# Patient Record
Sex: FEMALE | Race: OTHER | HISPANIC OR LATINO
[De-identification: names, ages, dates, MRNs, and addresses within clinical notes are randomized per-mention and may not be internally consistent; named-entity substitution may affect disease eponyms.]

---

## 2024-09-14 ENCOUNTER — RESULT REVIEW (OUTPATIENT)
Age: 44
End: 2024-09-14

## 2024-09-16 ENCOUNTER — TRANSCRIPTION ENCOUNTER (OUTPATIENT)
Age: 44
End: 2024-09-16

## 2024-09-23 PROBLEM — Z00.00 ENCOUNTER FOR PREVENTIVE HEALTH EXAMINATION: Status: ACTIVE | Noted: 2024-09-23

## 2024-09-26 DIAGNOSIS — N83.202 UNSPECIFIED OVARIAN CYST, LEFT SIDE: ICD-10-CM

## 2024-09-26 DIAGNOSIS — K43.9 VENTRAL HERNIA W/OUT OBSTRUCTION OR GANGRENE: ICD-10-CM

## 2024-09-26 PROBLEM — K35.80 ACUTE APPENDICITIS, UNCOMPLICATED: Status: ACTIVE | Noted: 2024-09-26

## 2024-09-30 ENCOUNTER — APPOINTMENT (OUTPATIENT)
Dept: SURGERY | Facility: CLINIC | Age: 44
End: 2024-09-30

## 2024-09-30 ENCOUNTER — APPOINTMENT (OUTPATIENT)
Dept: SURGERY | Facility: CLINIC | Age: 44
End: 2024-09-30
Payer: SELF-PAY

## 2024-09-30 VITALS
DIASTOLIC BLOOD PRESSURE: 60 MMHG | WEIGHT: 130 LBS | OXYGEN SATURATION: 98 % | TEMPERATURE: 98.6 F | HEART RATE: 76 BPM | SYSTOLIC BLOOD PRESSURE: 98 MMHG

## 2024-09-30 VITALS — BODY MASS INDEX: 23.03 KG/M2 | HEIGHT: 63 IN

## 2024-09-30 DIAGNOSIS — Z82.49 FAMILY HISTORY OF ISCHEMIC HEART DISEASE AND OTHER DISEASES OF THE CIRCULATORY SYSTEM: ICD-10-CM

## 2024-09-30 DIAGNOSIS — Z83.511 FAMILY HISTORY OF GLAUCOMA: ICD-10-CM

## 2024-09-30 DIAGNOSIS — Z80.51 FAMILY HISTORY OF MALIGNANT NEOPLASM OF KIDNEY: ICD-10-CM

## 2024-09-30 DIAGNOSIS — Z78.9 OTHER SPECIFIED HEALTH STATUS: ICD-10-CM

## 2024-09-30 DIAGNOSIS — K35.80 UNSPECIFIED ACUTE APPENDICITIS: ICD-10-CM

## 2024-09-30 PROCEDURE — 99024 POSTOP FOLLOW-UP VISIT: CPT

## 2024-10-01 NOTE — HISTORY OF PRESENT ILLNESS
[de-identified] : With the help of a [place]  we talked with the patient she is doing better from when she had her appendicitis she pain is gone and she feels some twinges every now and then which are normal she is eating well moving her bowels she is going to go back to work at [employer] tomorrow and we provided her a note for such we will see her back as needed her pathology held no surprises

## 2024-10-01 NOTE — REASON FOR VISIT
[Post Op: _________] : a [unfilled] post op visit [FreeTextEntry1] : Postop from laparoscopic appendectomy for acute appendicitis on September 16, 2024

## 2024-10-01 NOTE — DATA REVIEWED
[FreeTextEntry1] : SPEC #: [id number]          RECD: 09/16/     STATUS: ALFONSO RECOY #: [id number]            ARI: 09/15/24- ENTERED:  09/16/    SP TYPE: SURGICAL SUBM DR: Yovani Kessler DR: [address],[place]    FINAL DIAGNOSIS   APPENDIX, APPENDECTOMY: - Acute appendicitis with periappendicitis.  ICD CODES  K35.80 Pre-op Diagnosis: Abdominal pain, appendicitis Post-op Diagnosis: Same  SPECIMEN(S) RECEIVED  A. Specimen - APPENDIX